# Patient Record
Sex: MALE | Employment: PART TIME | URBAN - METROPOLITAN AREA
[De-identification: names, ages, dates, MRNs, and addresses within clinical notes are randomized per-mention and may not be internally consistent; named-entity substitution may affect disease eponyms.]

---
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
## 2020-07-25 ENCOUNTER — HOSPITAL ENCOUNTER (EMERGENCY)
Facility: CLINIC | Age: 20
Discharge: HOME | End: 2020-07-25
Attending: EMERGENCY MEDICINE | Admitting: EMERGENCY MEDICINE
Payer: OTHER GOVERNMENT

## 2020-07-25 ENCOUNTER — APPOINTMENT (OUTPATIENT)
Dept: GENERAL RADIOLOGY | Facility: CLINIC | Age: 20
End: 2020-07-25
Attending: EMERGENCY MEDICINE
Payer: OTHER GOVERNMENT

## 2020-07-25 VITALS
WEIGHT: 179 LBS | SYSTOLIC BLOOD PRESSURE: 154 MMHG | HEART RATE: 78 BPM | RESPIRATION RATE: 18 BRPM | TEMPERATURE: 99.1 F | BODY MASS INDEX: 28.09 KG/M2 | DIASTOLIC BLOOD PRESSURE: 96 MMHG | OXYGEN SATURATION: 97 % | HEIGHT: 67 IN

## 2020-07-25 DIAGNOSIS — R07.9 CHEST PAIN, UNSPECIFIED TYPE: ICD-10-CM

## 2020-07-25 LAB
ANION GAP SERPL CALCULATED.3IONS-SCNC: 5 MMOL/L (ref 3–14)
BASOPHILS # BLD AUTO: 0 10E9/L (ref 0–0.2)
BASOPHILS NFR BLD AUTO: 0.4 %
BUN SERPL-MCNC: 11 MG/DL (ref 7–30)
CALCIUM SERPL-MCNC: 9.3 MG/DL (ref 8.5–10.1)
CHLORIDE SERPL-SCNC: 106 MMOL/L (ref 94–109)
CO2 SERPL-SCNC: 28 MMOL/L (ref 20–32)
CREAT SERPL-MCNC: 0.9 MG/DL (ref 0.66–1.25)
D DIMER PPP FEU-MCNC: <0.3 UG/ML FEU (ref 0–0.5)
DIFFERENTIAL METHOD BLD: NORMAL
EOSINOPHIL # BLD AUTO: 0.2 10E9/L (ref 0–0.7)
EOSINOPHIL NFR BLD AUTO: 2.6 %
ERYTHROCYTE [DISTWIDTH] IN BLOOD BY AUTOMATED COUNT: 12.9 % (ref 10–15)
GFR SERPL CREATININE-BSD FRML MDRD: >90 ML/MIN/{1.73_M2}
GLUCOSE SERPL-MCNC: 86 MG/DL (ref 70–99)
HCT VFR BLD AUTO: 45.9 % (ref 40–53)
HGB BLD-MCNC: 15.6 G/DL (ref 13.3–17.7)
IMM GRANULOCYTES # BLD: 0 10E9/L (ref 0–0.4)
IMM GRANULOCYTES NFR BLD: 0.1 %
INTERPRETATION ECG - MUSE: NORMAL
LYMPHOCYTES # BLD AUTO: 1.5 10E9/L (ref 0.8–5.3)
LYMPHOCYTES NFR BLD AUTO: 22.5 %
MCH RBC QN AUTO: 28.8 PG (ref 26.5–33)
MCHC RBC AUTO-ENTMCNC: 34 G/DL (ref 31.5–36.5)
MCV RBC AUTO: 85 FL (ref 78–100)
MONOCYTES # BLD AUTO: 0.6 10E9/L (ref 0–1.3)
MONOCYTES NFR BLD AUTO: 8.4 %
NEUTROPHILS # BLD AUTO: 4.5 10E9/L (ref 1.6–8.3)
NEUTROPHILS NFR BLD AUTO: 66 %
NRBC # BLD AUTO: 0 10*3/UL
NRBC BLD AUTO-RTO: 0 /100
PLATELET # BLD AUTO: 254 10E9/L (ref 150–450)
POTASSIUM SERPL-SCNC: 3.4 MMOL/L (ref 3.4–5.3)
RBC # BLD AUTO: 5.42 10E12/L (ref 4.4–5.9)
SODIUM SERPL-SCNC: 139 MMOL/L (ref 133–144)
TROPONIN I SERPL-MCNC: 0.02 UG/L (ref 0–0.04)
TROPONIN I SERPL-MCNC: 0.02 UG/L (ref 0–0.04)
WBC # BLD AUTO: 6.8 10E9/L (ref 4–11)

## 2020-07-25 PROCEDURE — 85379 FIBRIN DEGRADATION QUANT: CPT | Performed by: EMERGENCY MEDICINE

## 2020-07-25 PROCEDURE — 99285 EMERGENCY DEPT VISIT HI MDM: CPT | Mod: 25

## 2020-07-25 PROCEDURE — 84484 ASSAY OF TROPONIN QUANT: CPT | Performed by: EMERGENCY MEDICINE

## 2020-07-25 PROCEDURE — C9803 HOPD COVID-19 SPEC COLLECT: HCPCS

## 2020-07-25 PROCEDURE — 93005 ELECTROCARDIOGRAM TRACING: CPT

## 2020-07-25 PROCEDURE — 85025 COMPLETE CBC W/AUTO DIFF WBC: CPT | Performed by: EMERGENCY MEDICINE

## 2020-07-25 PROCEDURE — 80048 BASIC METABOLIC PNL TOTAL CA: CPT | Performed by: EMERGENCY MEDICINE

## 2020-07-25 PROCEDURE — 71045 X-RAY EXAM CHEST 1 VIEW: CPT

## 2020-07-25 PROCEDURE — U0003 INFECTIOUS AGENT DETECTION BY NUCLEIC ACID (DNA OR RNA); SEVERE ACUTE RESPIRATORY SYNDROME CORONAVIRUS 2 (SARS-COV-2) (CORONAVIRUS DISEASE [COVID-19]), AMPLIFIED PROBE TECHNIQUE, MAKING USE OF HIGH THROUGHPUT TECHNOLOGIES AS DESCRIBED BY CMS-2020-01-R: HCPCS | Performed by: EMERGENCY MEDICINE

## 2020-07-25 ASSESSMENT — ENCOUNTER SYMPTOMS
SHORTNESS OF BREATH: 1
COUGH: 0
APPETITE CHANGE: 0
SORE THROAT: 0
FEVER: 0

## 2020-07-25 ASSESSMENT — MIFFLIN-ST. JEOR: SCORE: 1770.57

## 2020-07-25 NOTE — ED PROVIDER NOTES
History   Chief Complaint:  Chest Pain       The history is provided by the patient.      Mariano Albarado is a 20 year old male who presents for evaluation of chest pain. The patient notes that for the past 5 days he has had ongoing chest pain and headaches. The patient also notes shortness of breath. He reports that the chest pain was not getting better and that today during work had left arm pain while at work today.  He states it is mildly worse with deep breath.  He does do cardiovascular exercise and has no exercise intolerance.  He reports that he is right handed. He denies fever, cough, radiations or movement of the chest pain. He denies, abdominal pain, loss of smell or taste, recent travel or exposures to COVID, or changes in appetite.  He denies any illicit drug use.  He does state that he has been trying to lose weight so intermittent fasting and cardiovascular exercise.    Cardiac/PE/DVT Risk Factors:  History of hypertension - No  History of hyperlipidemia - No  History of diabetes - No  History of smoking - No  Personal history of PE/DVT - No  Family history of PE/DVT - No  Family history of heart complications - No  Recent travel - No  Recent surgery - No  Other immobilizations - No  Cancer - No     Allergies:  No known drug allergies     Medications:   The patient denies any significant past medical history.     Past Medical History:    The patient does not have any past pertinent medical history.     Past Surgical History:    The patient does not have any pertinent past surgical history.     Family History:    Diabetes    Social History:  Smoking status: Never smoker  Alcohol use: Yes  Drug use: no  PCP: [facility]  Presents to the ED with girlfriend  Up to date on immunization     Review of Systems   Constitutional: Negative for appetite change and fever.   HENT: Negative for sore throat.         Denies loss of taste or smell   Respiratory: Positive for shortness of breath.  "Negative for cough.    Cardiovascular: Positive for chest pain.   All other systems reviewed and are negative.      Physical Exam     Patient Vitals for the past 24 hrs:   BP Temp Temp src Pulse Heart Rate Resp SpO2 Height Weight   07/25/20 1730 138/81 -- -- 81 74 12 98 % -- --   07/25/20 1700 (!) 145/92 -- -- 76 78 17 97 % -- --   07/25/20 1630 138/83 -- -- 73 74 19 98 % -- --   07/25/20 1600 (!) 149/95 -- -- 82 80 16 98 % -- --   07/25/20 1506 (!) 159/99 99.1  F (37.3  C) Oral 94 -- 16 99 % 1.702 m (5' 7\") 81.2 kg (179 lb)       Physical Exam    Physical Exam   Constitutional:  Patient is oriented to person, place, and time. They appear well-developed and well-nourished. Mild distress secondary to chest pain.    HENT:   Mouth/Throat:   Oropharynx is clear and moist.   Eyes:    Conjunctivae normal and EOM are normal. Pupils are equal, round, and reactive to light.   Neck:    Normal range of motion.   Cardiovascular: Normal rate, regular rhythm and normal heart sounds.  Exam reveals no gallop and no friction rub.  No murmur heard.  Pulmonary/Chest:  Effort normal and breath sounds normal. Patient has no wheezes. Patient has no rales.   Abdominal:   Soft. Bowel sounds are normal. Patient exhibits no mass. There is no tenderness. There is no rebound and no guarding.   Musculoskeletal:  Normal range of motion. Patient exhibits no edema.   Neurological:   Patient is alert and oriented to person, place, and time. Patient has normal strength. No cranial nerve deficit or sensory deficit. GCS 15  Skin:   Skin is warm and dry. No rash noted. No erythema.   Psychiatric:   Patient has a normal mood and affect. Patient's behavior is normal. Judgment and thought content normal.       Emergency Department Course     ECG:  ECG taken at 1502, ECG read at 1510  Normal sinus rhythm with sinus arrhythmia  Rightward axis  Borderline ECG  Rate 90 bpm. DE interval 142 ms. QRS duration 90 ms. QT/QTc 360/440 ms. P-R-T axes 59 100 " 54.    Imaging:  Radiology findings were communicated with the patient who voiced understanding of the findings.    Chest PORT XR:  No acute disease.     Reading per radiology     Laboratory:  Laboratory findings were communicated with the patient who voiced understanding of the findings.    CBC: WBC 6.8, HGB 15.6,   BMP: o/w WNL (Creatinine 0.90)  Troponin (Collected 1616): 0.018   Troponin (Collected 1815): 0.024   D Dimer: <0.3    COVID-19 virus Swab PCR: pending      Emergency Department Course:   Nursing notes and vitals reviewed.    1512 I performed an exam of the patient as documented above.     1556 IV was inserted and blood was drawn for laboratory testing, results above.     1601 The patient had a Chest PORT XR while in the emergency department, results above.       1742 I checked on the patient and he agreed to another troponin.     1823 I checked on and updated the patient and his girlfriend. I personally reviewed the results with the patient and answered all related questions prior to discharge.    Impression & Plan      Medical Decision Making:  Mariano Albarado is a 20 year old male who presents to the emergency department today for evaluation of chest pain that is nonexertional.  Mild shortness of breath.  He is otherwise healthy.  Cardiac and PE risk factors were considered.  EKG shows no evidence of arrhythmia or ischemia.  Chest x-ray shows no evidence of pleural effusion, pneumonia, pneumothorax, abnormal mediastinum, or masses.  Basic blood work including CBC, metabolic panel, d-dimer, and delta troponins are normal.  His troponin is detectable but still within normal limits.      His symptomatology was not consistent with dissection.  He is PERC rule negative.  I did consider COVID with his mild shortness of breath so he was swab for this and the test result is pending at this time.  At this time given his work-up and atypical nature of his discomfort I feel he is safe for  discharge.  I did review with him specifically the detectable but normal blood tests and therefore I am recommending that he follow-up closely with his primary care doctor in [place] on Monday.  He will refrain from exercise until he does follow-up.          Covid-19  Mariano Albarado was evaluated during a global COVID-19 pandemic, which necessitated consideration that the patient might be at risk for infection with the SARS-CoV-2 virus that causes COVID-19.  Applicable protocols for evaluation were followed during the patient's care.   COVID-19 was considered as part of the patient's evaluation. The plan for testing is:  a test was obtained during this visit.     Diagnosis:    ICD-10-CM    1. Chest pain, unspecified type  R07.9        Disposition:   The patient is discharged to home.     Scribe Disclosure:  I, Krys Boyd, am serving as a scribe at 3:09 PM on 7/25/2020 to document services personally performed by Zuleika Lanier MD based on my observations and the provider's statements to me.  [facility] EMERGENCY DEPARTMENT         Zuleika Lanier MD  07/25/20 5382

## 2020-07-25 NOTE — ED AVS SNAPSHOT
Emergency Department  [address] [address]  Phone:  [phone]  Fax:  [phone]                                    Mariano Albarado   MRN: [id number]    Department:   Emergency Department   Date of Visit:  7/25/2020           After Visit Summary Signature Page    I have received my discharge instructions, and my questions have been answered. I have discussed any challenges I see with this plan with the nurse or doctor.    ..........................................................................................................................................  Patient/Patient Representative Signature      ..........................................................................................................................................  Patient Representative Print Name and Relationship to Patient    ..................................................               ................................................  Date                                   Time    ..........................................................................................................................................  Reviewed by Signature/Title    ...................................................              ..............................................  Date                                               Time          22EPIC Rev 08/18

## 2020-07-26 LAB
SARS-COV-2 RNA SPEC QL NAA+PROBE: NOT DETECTED
SPECIMEN SOURCE: NORMAL

## 2020-12-27 ENCOUNTER — HEALTH MAINTENANCE LETTER (OUTPATIENT)
Age: 20
End: 2020-12-27

## 2021-10-03 ENCOUNTER — HEALTH MAINTENANCE LETTER (OUTPATIENT)
Age: 21
End: 2021-10-03

## 2022-01-23 ENCOUNTER — HEALTH MAINTENANCE LETTER (OUTPATIENT)
Age: 22
End: 2022-01-23

## 2022-09-11 ENCOUNTER — HEALTH MAINTENANCE LETTER (OUTPATIENT)
Age: 22
End: 2022-09-11

## 2023-04-30 ENCOUNTER — HEALTH MAINTENANCE LETTER (OUTPATIENT)
Age: 23
End: 2023-04-30